# Patient Record
Sex: MALE | Race: WHITE | URBAN - METROPOLITAN AREA
[De-identification: names, ages, dates, MRNs, and addresses within clinical notes are randomized per-mention and may not be internally consistent; named-entity substitution may affect disease eponyms.]

---

## 2017-01-13 ENCOUNTER — OUTPATIENT (OUTPATIENT)
Dept: OUTPATIENT SERVICES | Facility: HOSPITAL | Age: 52
LOS: 1 days | End: 2017-01-13

## 2017-01-17 ENCOUNTER — OUTPATIENT (OUTPATIENT)
Dept: OUTPATIENT SERVICES | Facility: HOSPITAL | Age: 52
LOS: 1 days | End: 2017-01-17

## 2024-08-15 ENCOUNTER — APPOINTMENT (OUTPATIENT)
Dept: ORTHOPEDIC SURGERY | Facility: CLINIC | Age: 59
End: 2024-08-15
Payer: COMMERCIAL

## 2024-08-15 VITALS — BODY MASS INDEX: 26.6 KG/M2 | HEIGHT: 71 IN | WEIGHT: 190 LBS

## 2024-08-15 DIAGNOSIS — M25.562 PAIN IN LEFT KNEE: ICD-10-CM

## 2024-08-15 PROBLEM — Z00.00 ENCOUNTER FOR PREVENTIVE HEALTH EXAMINATION: Status: ACTIVE | Noted: 2024-08-15

## 2024-08-15 PROCEDURE — 73564 X-RAY EXAM KNEE 4 OR MORE: CPT | Mod: LT

## 2024-08-15 PROCEDURE — 99204 OFFICE O/P NEW MOD 45 MIN: CPT

## 2024-08-15 RX ORDER — IBUPROFEN 400 MG/1
400 TABLET, FILM COATED ORAL
Qty: 30 | Refills: 0 | Status: ACTIVE | COMMUNITY
Start: 2024-08-15 | End: 1900-01-01

## 2024-08-20 PROBLEM — M25.562 ACUTE PAIN OF LEFT KNEE: Status: ACTIVE | Noted: 2024-08-20

## 2024-08-20 NOTE — HISTORY OF PRESENT ILLNESS
[de-identified] : 59-year-old male presents with left knee pain lasting for one month. He reports no recent injury but has been engaging in increased yard work recently. The pain is described as constant and worsens with prolonged standing, sitting, and stair usage. The patient has been using Advil as needed, which provides some relief. He denies experiencing buckling, catching, locking, numbness, or tingling in the knee. He had a past meniscus surgery for the lateral meniscus 30 years ago.  The patient's past medical history, past surgical history, medications and allergies were reviewed by me today with the patient and documented accordingly. In addition, the patient's family and social history, which were noncontributory to this visit were reviewed also.

## 2024-08-20 NOTE — ADDENDUM
[FreeTextEntry1] : This note was written by Aurora Sanders on 08/15/2024 acting solely as a scribe for Dr. Shayan Yoon.  All medical record entries made by the Scribe were at my, Dr. Shayan Yoon, direction and personally dictated by me on 08/15/2024. I have personally reviewed the chart and agree that the record accurately reflects my personal performance of the history, physical exam, assessment and plan.

## 2024-08-20 NOTE — PHYSICAL EXAM
[de-identified] : Left knee exam  Skin: Clean, dry, intact Inspection: No obvious malalignment, no masses, no swelling, no effusion Pulses: 2+ DP/PT pulses ROM: 0-135 degrees of flexion. No pain with deep knee flexion/extension. Tenderness: +MJLT. No LJLT. No pain over the patella facets. No pain to the quadriceps tendon. No pain to the patella tendon. No posterior knee tenderness. Stability: Stable to varus, valgus. Negative lachman testing. Negative anterior drawer, negative posterior drawer. Strength: 5/5 Q/H/TA/GS/EHL, without atrophy Neuro: In tact to light touch throughout, DTR's normal Additional tests: + McMurrays test, Negative patellar grind test. [de-identified] : The following radiographs were ordered and read by me during this patients visit. I reviewed each radiograph in detail with the patient and discussed the findings as highlighted below.   4 views of the left knee were obtained, 08/15/2024, that show no acute fracture or dislocation. There is no medial, no lateral and no patellofemoral degenerative changes seen. Quad tendinopathy. [place]

## 2024-08-20 NOTE — HISTORY OF PRESENT ILLNESS
[de-identified] : 59-year-old male presents with left knee pain lasting for one month. He reports no recent injury but has been engaging in increased yard work recently. The pain is described as constant and worsens with prolonged standing, sitting, and stair usage. The patient has been using Advil as needed, which provides some relief. He denies experiencing buckling, catching, locking, numbness, or tingling in the knee. He had a past meniscus surgery for the lateral meniscus 30 years ago.  The patient's past medical history, past surgical history, medications and allergies were reviewed by me today with the patient and documented accordingly. In addition, the patient's family and social history, which were noncontributory to this visit were reviewed also.

## 2024-08-20 NOTE — DISCUSSION/SUMMARY
[Medication Risks Reviewed] : Medication risks reviewed [de-identified] : 58 y/o male with left knee pain.   The patient's symptoms are consistent with possible meniscal pathology through the medial compartment of the knee with positive provocative meniscal testing as well as joint line tenderness. I discussed the treatment of degenerative meniscal pathology in great detail and described short-term and long-term outcomes of both surgical and non-surgical treatment. 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 Described trial of conservative management with hopeful resolution of symptoms, versus surgical arthroscopy with meniscal debridement.  Discussed utility of injection therapy as an adjunct for acute exacerbation of pain.  Also discussed role of surgical arthroscopy with meniscal debridement that is typically considered once pain is refractory to attempts at conservative management or a patient develops mechanical symptoms associated with the meniscal injury.  MRI prescription provided to fully evaluate internal derangement.   Recommendation: MRI Rx given. Ibuprofen Rx given.  Activity modification.  Ice as needed  Follow up after MRI

## 2024-08-20 NOTE — PHYSICAL EXAM
[de-identified] : Left knee exam  Skin: Clean, dry, intact Inspection: No obvious malalignment, no masses, no swelling, no effusion Pulses: 2+ DP/PT pulses ROM: 0-135 degrees of flexion. No pain with deep knee flexion/extension. Tenderness: +MJLT. No LJLT. No pain over the patella facets. No pain to the quadriceps tendon. No pain to the patella tendon. No posterior knee tenderness. Stability: Stable to varus, valgus. Negative lachman testing. Negative anterior drawer, negative posterior drawer. Strength: 5/5 Q/H/TA/GS/EHL, without atrophy Neuro: In tact to light touch throughout, DTR's normal Additional tests: + McMurrays test, Negative patellar grind test. [de-identified] : The following radiographs were ordered and read by me during this patients visit. I reviewed each radiograph in detail with the patient and discussed the findings as highlighted below.   4 views of the left knee were obtained, 08/15/2024, that show no acute fracture or dislocation. There is no medial, no lateral and no patellofemoral degenerative changes seen. Quad tendinopathy. [place]

## 2024-08-20 NOTE — DISCUSSION/SUMMARY
[Medication Risks Reviewed] : Medication risks reviewed [de-identified] : 60 y/o male with left knee pain.   The patient's symptoms are consistent with possible meniscal pathology through the medial compartment of the knee with positive provocative meniscal testing as well as joint line tenderness. I discussed the treatment of degenerative meniscal pathology in great detail and described short-term and long-term outcomes of both surgical and non-surgical treatment. 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 Described trial of conservative management with hopeful resolution of symptoms, versus surgical arthroscopy with meniscal debridement.  Discussed utility of injection therapy as an adjunct for acute exacerbation of pain.  Also discussed role of surgical arthroscopy with meniscal debridement that is typically considered once pain is refractory to attempts at conservative management or a patient develops mechanical symptoms associated with the meniscal injury.  MRI prescription provided to fully evaluate internal derangement.   Recommendation: MRI Rx given. Ibuprofen Rx given.  Activity modification.  Ice as needed  Follow up after MRI